# Patient Record
Sex: FEMALE | Race: WHITE | NOT HISPANIC OR LATINO
[De-identification: names, ages, dates, MRNs, and addresses within clinical notes are randomized per-mention and may not be internally consistent; named-entity substitution may affect disease eponyms.]

---

## 2019-01-03 PROBLEM — Z00.00 ENCOUNTER FOR PREVENTIVE HEALTH EXAMINATION: Status: ACTIVE | Noted: 2019-01-03

## 2019-01-08 ENCOUNTER — FORM ENCOUNTER (OUTPATIENT)
Age: 49
End: 2019-01-08

## 2019-01-09 ENCOUNTER — OUTPATIENT (OUTPATIENT)
Dept: OUTPATIENT SERVICES | Facility: HOSPITAL | Age: 49
LOS: 1 days | End: 2019-01-09
Payer: COMMERCIAL

## 2019-01-09 ENCOUNTER — APPOINTMENT (OUTPATIENT)
Dept: CT IMAGING | Facility: HOSPITAL | Age: 49
End: 2019-01-09
Payer: COMMERCIAL

## 2019-01-09 PROCEDURE — 71250 CT THORAX DX C-: CPT

## 2019-01-09 PROCEDURE — 71250 CT THORAX DX C-: CPT | Mod: 26

## 2019-01-22 ENCOUNTER — APPOINTMENT (OUTPATIENT)
Dept: THORACIC SURGERY | Facility: CLINIC | Age: 49
End: 2019-01-22
Payer: COMMERCIAL

## 2019-01-22 VITALS
TEMPERATURE: 97.7 F | BODY MASS INDEX: 22.36 KG/M2 | DIASTOLIC BLOOD PRESSURE: 59 MMHG | HEIGHT: 64 IN | RESPIRATION RATE: 18 BRPM | WEIGHT: 131 LBS | HEART RATE: 63 BPM | SYSTOLIC BLOOD PRESSURE: 117 MMHG | OXYGEN SATURATION: 97 %

## 2019-01-22 DIAGNOSIS — R91.8 OTHER NONSPECIFIC ABNORMAL FINDING OF LUNG FIELD: ICD-10-CM

## 2019-01-22 DIAGNOSIS — R91.1 SOLITARY PULMONARY NODULE: ICD-10-CM

## 2019-01-22 DIAGNOSIS — Z87.898 PERSONAL HISTORY OF OTHER SPECIFIED CONDITIONS: ICD-10-CM

## 2019-01-22 PROCEDURE — 99203 OFFICE O/P NEW LOW 30 MIN: CPT

## 2019-01-22 NOTE — PHYSICAL EXAM
[General Appearance - Alert] : alert [] : no respiratory distress [Respiration, Rhythm And Depth] : normal respiratory rhythm and effort [Exaggerated Use Of Accessory Muscles For Inspiration] : no accessory muscle use [Apical Impulse] : the apical impulse was normal [Heart Rate And Rhythm] : heart rate was normal and rhythm regular [Heart Sounds] : normal S1 and S2 [2+] : left 2+ [Abnormal Walk] : normal gait [Skin Color & Pigmentation] : normal skin color and pigmentation

## 2019-01-26 PROBLEM — R91.1 LUNG NODULE: Status: ACTIVE | Noted: 2019-01-07

## 2019-01-26 PROBLEM — R91.8 MULTIPLE LUNG NODULES ON CT: Status: ACTIVE | Noted: 2019-01-26

## 2019-01-26 NOTE — ASSESSMENT
[FreeTextEntry1] : 48 yr old female, nonsmoker presents today to review recent CT chest. She is asymptomatic, denies shortness of breath, fever, or fatigue. \par \par CT chest was reviewed which revealed multiple scattered nodules without concern at this time. Fleischer's society recommendations were reviewed with the patient.  Plan to repeat CT chest in one year to further evaluate progression, which the patient prefers.  Although Fleischner guidelines say no further follow up, these are guidelines and we will proceed with follow up in 1 year and the.\par \par All questions and concerns were answered in detail to the patient. \par \par Plan:\par 1. CT chest without contrast in on year

## 2019-01-26 NOTE — HISTORY OF PRESENT ILLNESS
[FreeTextEntry1] : 48 year old female, nonsmoker with a PMH of asthma, headaches, and abdominal pain. She was evaluated for abdominal pain which revealed a lung nodule. Further workup with a dedicated CT chest. She presents today to discuss findings. She is self referred. \par \par CT scan completed on 1/9/2019:\par - negative for thoracic inlet, axillary, mediastinal, or hilar lymphadenopathy\par - evaluation of the lung parenchyma demonstrated a 2 mm subpleural linear opacity within the right middle lobe and a 4 mm nodular opacity within the right lower lobe, for which follow-up is not required. \par - there is a tiny 3 mm subpleural ground glass nodoule within the left lower lobe for which follow up is not required.\par - there are a few scattered up to 2 mm fissural nodules along the left major fissure\par - no endobronchial lesion\par \par Patient denies cough, hemoptysis, shortness of breath, weight change, nausea, vomiting, diarrhea, chest pain, fever, or any recent illnesses or hospitalizations.

## 2019-02-11 ENCOUNTER — OUTPATIENT (OUTPATIENT)
Dept: OUTPATIENT SERVICES | Facility: HOSPITAL | Age: 49
LOS: 1 days | End: 2019-02-11
Payer: COMMERCIAL

## 2019-02-11 ENCOUNTER — APPOINTMENT (OUTPATIENT)
Dept: MRI IMAGING | Facility: HOSPITAL | Age: 49
End: 2019-02-11
Payer: COMMERCIAL

## 2019-02-11 PROCEDURE — 73721 MRI JNT OF LWR EXTRE W/O DYE: CPT | Mod: 26,RT

## 2019-02-11 PROCEDURE — 73721 MRI JNT OF LWR EXTRE W/O DYE: CPT

## 2019-03-05 ENCOUNTER — TRANSCRIPTION ENCOUNTER (OUTPATIENT)
Age: 49
End: 2019-03-05

## 2019-03-12 ENCOUNTER — TRANSCRIPTION ENCOUNTER (OUTPATIENT)
Age: 49
End: 2019-03-12

## 2019-04-19 ENCOUNTER — APPOINTMENT (OUTPATIENT)
Dept: ULTRASOUND IMAGING | Facility: HOSPITAL | Age: 49
End: 2019-04-19
Payer: COMMERCIAL

## 2019-04-19 ENCOUNTER — OUTPATIENT (OUTPATIENT)
Dept: OUTPATIENT SERVICES | Facility: HOSPITAL | Age: 49
LOS: 1 days | End: 2019-04-19
Payer: COMMERCIAL

## 2019-04-19 ENCOUNTER — APPOINTMENT (OUTPATIENT)
Dept: MRI IMAGING | Facility: HOSPITAL | Age: 49
End: 2019-04-19
Payer: COMMERCIAL

## 2019-04-19 DIAGNOSIS — E04.2 NONTOXIC MULTINODULAR GOITER: ICD-10-CM

## 2019-04-19 DIAGNOSIS — M89.8X8 OTHER SPECIFIED DISORDERS OF BONE, OTHER SITE: ICD-10-CM

## 2019-04-19 DIAGNOSIS — R60.0 LOCALIZED EDEMA: ICD-10-CM

## 2019-04-19 PROCEDURE — 76536 US EXAM OF HEAD AND NECK: CPT | Mod: 26

## 2019-04-19 PROCEDURE — 73721 MRI JNT OF LWR EXTRE W/O DYE: CPT | Mod: 26,RT

## 2019-04-19 PROCEDURE — 76536 US EXAM OF HEAD AND NECK: CPT

## 2019-04-19 PROCEDURE — 73721 MRI JNT OF LWR EXTRE W/O DYE: CPT

## 2020-01-12 ENCOUNTER — FORM ENCOUNTER (OUTPATIENT)
Age: 50
End: 2020-01-12

## 2020-01-13 ENCOUNTER — APPOINTMENT (OUTPATIENT)
Dept: CT IMAGING | Facility: HOSPITAL | Age: 50
End: 2020-01-13
Payer: COMMERCIAL

## 2020-01-13 ENCOUNTER — OUTPATIENT (OUTPATIENT)
Dept: OUTPATIENT SERVICES | Facility: HOSPITAL | Age: 50
LOS: 1 days | End: 2020-01-13
Payer: COMMERCIAL

## 2020-01-13 PROCEDURE — 71250 CT THORAX DX C-: CPT | Mod: 26

## 2020-01-13 PROCEDURE — 71250 CT THORAX DX C-: CPT
